# Patient Record
Sex: FEMALE | Race: WHITE | Employment: UNEMPLOYED | URBAN - METROPOLITAN AREA
[De-identification: names, ages, dates, MRNs, and addresses within clinical notes are randomized per-mention and may not be internally consistent; named-entity substitution may affect disease eponyms.]

---

## 2019-09-24 LAB
HBV SURFACE AG SERPL QL IA: NEGATIVE
HIV 1+2 AB+HIV1 P24 AG SERPL QL IA: NEGATIVE
RUBELLA ABY IGG: NORMAL
TREPONEMA ANTIBODIES: NON REACTIVE

## 2020-04-16 LAB — GROUP B STREP PCR: NEGATIVE

## 2020-04-22 RX ORDER — LORATADINE 10 MG/1
10 TABLET ORAL DAILY
COMMUNITY

## 2020-04-22 RX ORDER — ACETAMINOPHEN 500 MG
500-1000 TABLET ORAL EVERY 6 HOURS PRN
Status: ON HOLD | COMMUNITY
End: 2020-05-02

## 2020-04-22 ASSESSMENT — MIFFLIN-ST. JEOR: SCORE: 1424.88

## 2020-04-28 ENCOUNTER — OFFICE VISIT (OUTPATIENT)
Dept: URGENT CARE | Facility: URGENT CARE | Age: 30
End: 2020-04-28
Payer: COMMERCIAL

## 2020-04-28 DIAGNOSIS — Z20.822 SUSPECTED 2019 NOVEL CORONAVIRUS INFECTION: Primary | ICD-10-CM

## 2020-04-28 PROCEDURE — 87635 SARS-COV-2 COVID-19 AMP PRB: CPT | Performed by: FAMILY MEDICINE

## 2020-04-28 PROCEDURE — 99000 SPECIMEN HANDLING OFFICE-LAB: CPT | Performed by: FAMILY MEDICINE

## 2020-04-28 PROCEDURE — 99207 ZZC NO BILLABLE SERVICE THIS VISIT: CPT

## 2020-04-29 ENCOUNTER — ANESTHESIA EVENT (OUTPATIENT)
Dept: OBGYN | Facility: CLINIC | Age: 30
End: 2020-04-29
Payer: COMMERCIAL

## 2020-04-29 ENCOUNTER — HOSPITAL ENCOUNTER (OUTPATIENT)
Dept: LAB | Facility: CLINIC | Age: 30
Discharge: HOME | End: 2020-04-29
Attending: OBSTETRICS & GYNECOLOGY | Admitting: OBSTETRICS & GYNECOLOGY
Payer: COMMERCIAL

## 2020-04-29 DIAGNOSIS — Z01.812 PRE-OPERATIVE LABORATORY EXAMINATION: ICD-10-CM

## 2020-04-29 LAB
ABO + RH BLD: ABNORMAL
ABO + RH BLD: ABNORMAL
BLD GP AB INVEST PLASRBC-IMP: ABNORMAL
BLD GP AB SCN SERPL QL: ABNORMAL
BLOOD BANK CMNT PATIENT-IMP: ABNORMAL
HGB BLD-MCNC: 12.6 G/DL (ref 11.7–15.7)
SARS-COV-2 RNA SPEC QL NAA+PROBE: NOT DETECTED
SPECIMEN EXP DATE BLD: ABNORMAL
SPECIMEN SOURCE: NORMAL

## 2020-04-29 PROCEDURE — 86870 RBC ANTIBODY IDENTIFICATION: CPT | Performed by: OBSTETRICS & GYNECOLOGY

## 2020-04-29 PROCEDURE — 86850 RBC ANTIBODY SCREEN: CPT | Performed by: OBSTETRICS & GYNECOLOGY

## 2020-04-29 PROCEDURE — 86900 BLOOD TYPING SEROLOGIC ABO: CPT | Performed by: OBSTETRICS & GYNECOLOGY

## 2020-04-29 PROCEDURE — 86901 BLOOD TYPING SEROLOGIC RH(D): CPT | Performed by: OBSTETRICS & GYNECOLOGY

## 2020-04-29 PROCEDURE — 36415 COLL VENOUS BLD VENIPUNCTURE: CPT | Performed by: OBSTETRICS & GYNECOLOGY

## 2020-04-29 PROCEDURE — 85018 HEMOGLOBIN: CPT | Performed by: OBSTETRICS & GYNECOLOGY

## 2020-04-29 PROCEDURE — 86780 TREPONEMA PALLIDUM: CPT | Performed by: OBSTETRICS & GYNECOLOGY

## 2020-04-30 ENCOUNTER — ANESTHESIA (OUTPATIENT)
Dept: OBGYN | Facility: CLINIC | Age: 30
End: 2020-04-30
Payer: COMMERCIAL

## 2020-04-30 ENCOUNTER — HOSPITAL ENCOUNTER (INPATIENT)
Facility: CLINIC | Age: 30
LOS: 2 days | Discharge: HOME HEALTH | End: 2020-05-02
Attending: OBSTETRICS & GYNECOLOGY | Admitting: OBSTETRICS & GYNECOLOGY
Payer: COMMERCIAL

## 2020-04-30 DIAGNOSIS — D62 ANEMIA DUE TO BLOOD LOSS, ACUTE: ICD-10-CM

## 2020-04-30 DIAGNOSIS — Z98.891 S/P CESAREAN SECTION: Primary | ICD-10-CM

## 2020-04-30 LAB
BLOOD BANK CMNT PATIENT-IMP: NORMAL
BLOOD BANK CMNT PATIENT-IMP: NORMAL
T PALLIDUM AB SER QL: NONREACTIVE

## 2020-04-30 PROCEDURE — 12000000 ZZH R&B MED SURG/OB

## 2020-04-30 PROCEDURE — 80307 DRUG TEST PRSMV CHEM ANLYZR: CPT | Performed by: OBSTETRICS & GYNECOLOGY

## 2020-04-30 PROCEDURE — 37000009 ZZH ANESTHESIA TECHNICAL FEE, EACH ADDTL 15 MIN: Performed by: OBSTETRICS & GYNECOLOGY

## 2020-04-30 PROCEDURE — 25000128 H RX IP 250 OP 636: Performed by: NURSE ANESTHETIST, CERTIFIED REGISTERED

## 2020-04-30 PROCEDURE — 25000132 ZZH RX MED GY IP 250 OP 250 PS 637: Performed by: OBSTETRICS & GYNECOLOGY

## 2020-04-30 PROCEDURE — 71000013 ZZH RECOVERY PHASE 1 LEVEL 1 EA ADDTL HR: Performed by: OBSTETRICS & GYNECOLOGY

## 2020-04-30 PROCEDURE — 25000125 ZZHC RX 250: Performed by: OBSTETRICS & GYNECOLOGY

## 2020-04-30 PROCEDURE — 40000358 ZZHCL STATISTIC DRUG SCREEN MULTIPLE (METRO): Performed by: OBSTETRICS & GYNECOLOGY

## 2020-04-30 PROCEDURE — 25800030 ZZH RX IP 258 OP 636: Performed by: OBSTETRICS & GYNECOLOGY

## 2020-04-30 PROCEDURE — 27210794 ZZH OR GENERAL SUPPLY STERILE: Performed by: OBSTETRICS & GYNECOLOGY

## 2020-04-30 PROCEDURE — 25000125 ZZHC RX 250

## 2020-04-30 PROCEDURE — 37000008 ZZH ANESTHESIA TECHNICAL FEE, 1ST 30 MIN: Performed by: OBSTETRICS & GYNECOLOGY

## 2020-04-30 PROCEDURE — 36000058 ZZH SURGERY LEVEL 3 EA 15 ADDTL MIN: Performed by: OBSTETRICS & GYNECOLOGY

## 2020-04-30 PROCEDURE — 71000012 ZZH RECOVERY PHASE 1 LEVEL 1 FIRST HR: Performed by: OBSTETRICS & GYNECOLOGY

## 2020-04-30 PROCEDURE — 25000125 ZZHC RX 250: Performed by: NURSE ANESTHETIST, CERTIFIED REGISTERED

## 2020-04-30 PROCEDURE — 25000128 H RX IP 250 OP 636: Performed by: OBSTETRICS & GYNECOLOGY

## 2020-04-30 PROCEDURE — 25000128 H RX IP 250 OP 636: Performed by: ANESTHESIOLOGY

## 2020-04-30 PROCEDURE — 36000056 ZZH SURGERY LEVEL 3 1ST 30 MIN: Performed by: OBSTETRICS & GYNECOLOGY

## 2020-04-30 RX ORDER — OXYTOCIN 10 [USP'U]/ML
10 INJECTION, SOLUTION INTRAMUSCULAR; INTRAVENOUS
Status: DISCONTINUED | OUTPATIENT
Start: 2020-04-30 | End: 2020-05-02 | Stop reason: HOSPADM

## 2020-04-30 RX ORDER — AMOXICILLIN 250 MG
2 CAPSULE ORAL 2 TIMES DAILY
Status: DISCONTINUED | OUTPATIENT
Start: 2020-04-30 | End: 2020-05-02 | Stop reason: HOSPADM

## 2020-04-30 RX ORDER — ACETAMINOPHEN 325 MG/1
975 TABLET ORAL EVERY 6 HOURS
Status: DISCONTINUED | OUTPATIENT
Start: 2020-04-30 | End: 2020-05-02 | Stop reason: HOSPADM

## 2020-04-30 RX ORDER — MISOPROSTOL 200 UG/1
400 TABLET ORAL
Status: DISCONTINUED | OUTPATIENT
Start: 2020-04-30 | End: 2020-05-02 | Stop reason: HOSPADM

## 2020-04-30 RX ORDER — OXYTOCIN/0.9 % SODIUM CHLORIDE 30/500 ML
PLASTIC BAG, INJECTION (ML) INTRAVENOUS PRN
Status: DISCONTINUED | OUTPATIENT
Start: 2020-04-30 | End: 2020-04-30

## 2020-04-30 RX ORDER — SODIUM CHLORIDE, SODIUM LACTATE, POTASSIUM CHLORIDE, CALCIUM CHLORIDE 600; 310; 30; 20 MG/100ML; MG/100ML; MG/100ML; MG/100ML
INJECTION, SOLUTION INTRAVENOUS CONTINUOUS
Status: DISCONTINUED | OUTPATIENT
Start: 2020-04-30 | End: 2020-04-30

## 2020-04-30 RX ORDER — ONDANSETRON 4 MG/1
4 TABLET, ORALLY DISINTEGRATING ORAL EVERY 30 MIN PRN
Status: DISCONTINUED | OUTPATIENT
Start: 2020-04-30 | End: 2020-04-30 | Stop reason: HOSPADM

## 2020-04-30 RX ORDER — METHYLERGONOVINE MALEATE 0.2 MG/ML
200 INJECTION INTRAVENOUS
Status: DISCONTINUED | OUTPATIENT
Start: 2020-04-30 | End: 2020-05-02 | Stop reason: HOSPADM

## 2020-04-30 RX ORDER — SODIUM CHLORIDE, SODIUM LACTATE, POTASSIUM CHLORIDE, CALCIUM CHLORIDE 600; 310; 30; 20 MG/100ML; MG/100ML; MG/100ML; MG/100ML
INJECTION, SOLUTION INTRAVENOUS CONTINUOUS
Status: DISCONTINUED | OUTPATIENT
Start: 2020-04-30 | End: 2020-04-30 | Stop reason: HOSPADM

## 2020-04-30 RX ORDER — MODIFIED LANOLIN
OINTMENT (GRAM) TOPICAL
Status: DISCONTINUED | OUTPATIENT
Start: 2020-04-30 | End: 2020-05-02 | Stop reason: HOSPADM

## 2020-04-30 RX ORDER — MAGNESIUM HYDROXIDE 1200 MG/15ML
LIQUID ORAL PRN
Status: DISCONTINUED | OUTPATIENT
Start: 2020-04-30 | End: 2020-05-02 | Stop reason: HOSPADM

## 2020-04-30 RX ORDER — ONDANSETRON 2 MG/ML
4 INJECTION INTRAMUSCULAR; INTRAVENOUS EVERY 6 HOURS PRN
Status: DISCONTINUED | OUTPATIENT
Start: 2020-04-30 | End: 2020-05-02 | Stop reason: HOSPADM

## 2020-04-30 RX ORDER — NALOXONE HYDROCHLORIDE 0.4 MG/ML
.1-.4 INJECTION, SOLUTION INTRAMUSCULAR; INTRAVENOUS; SUBCUTANEOUS
Status: CANCELLED | OUTPATIENT
Start: 2020-04-30 | End: 2020-05-01

## 2020-04-30 RX ORDER — ACETAMINOPHEN 325 MG/1
975 TABLET ORAL ONCE
Status: COMPLETED | OUTPATIENT
Start: 2020-04-30 | End: 2020-04-30

## 2020-04-30 RX ORDER — OXYTOCIN/0.9 % SODIUM CHLORIDE 30/500 ML
340 PLASTIC BAG, INJECTION (ML) INTRAVENOUS CONTINUOUS PRN
Status: DISCONTINUED | OUTPATIENT
Start: 2020-04-30 | End: 2020-05-02 | Stop reason: HOSPADM

## 2020-04-30 RX ORDER — ONDANSETRON 2 MG/ML
4 INJECTION INTRAMUSCULAR; INTRAVENOUS EVERY 30 MIN PRN
Status: DISCONTINUED | OUTPATIENT
Start: 2020-04-30 | End: 2020-04-30 | Stop reason: HOSPADM

## 2020-04-30 RX ORDER — IBUPROFEN 800 MG/1
800 TABLET, FILM COATED ORAL EVERY 6 HOURS
Status: DISCONTINUED | OUTPATIENT
Start: 2020-05-01 | End: 2020-05-02 | Stop reason: HOSPADM

## 2020-04-30 RX ORDER — KETOROLAC TROMETHAMINE 30 MG/ML
30 INJECTION, SOLUTION INTRAMUSCULAR; INTRAVENOUS EVERY 6 HOURS
Status: COMPLETED | OUTPATIENT
Start: 2020-04-30 | End: 2020-05-01

## 2020-04-30 RX ORDER — NALOXONE HYDROCHLORIDE 0.4 MG/ML
.1-.4 INJECTION, SOLUTION INTRAMUSCULAR; INTRAVENOUS; SUBCUTANEOUS
Status: DISCONTINUED | OUTPATIENT
Start: 2020-04-30 | End: 2020-05-02 | Stop reason: HOSPADM

## 2020-04-30 RX ORDER — NALBUPHINE HYDROCHLORIDE 20 MG/ML
5 INJECTION, SOLUTION INTRAMUSCULAR; INTRAVENOUS; SUBCUTANEOUS
Status: DISCONTINUED | OUTPATIENT
Start: 2020-04-30 | End: 2020-05-02 | Stop reason: HOSPADM

## 2020-04-30 RX ORDER — OXYTOCIN/0.9 % SODIUM CHLORIDE 30/500 ML
100 PLASTIC BAG, INJECTION (ML) INTRAVENOUS CONTINUOUS
Status: DISCONTINUED | OUTPATIENT
Start: 2020-04-30 | End: 2020-05-02 | Stop reason: HOSPADM

## 2020-04-30 RX ORDER — MORPHINE SULFATE 1 MG/ML
INJECTION, SOLUTION EPIDURAL; INTRATHECAL; INTRAVENOUS PRN
Status: DISCONTINUED | OUTPATIENT
Start: 2020-04-30 | End: 2020-04-30

## 2020-04-30 RX ORDER — HYDRALAZINE HYDROCHLORIDE 20 MG/ML
2.5-5 INJECTION INTRAMUSCULAR; INTRAVENOUS EVERY 10 MIN PRN
Status: DISCONTINUED | OUTPATIENT
Start: 2020-04-30 | End: 2020-04-30 | Stop reason: HOSPADM

## 2020-04-30 RX ORDER — OXYCODONE HYDROCHLORIDE 5 MG/1
5 TABLET ORAL EVERY 4 HOURS PRN
Status: DISCONTINUED | OUTPATIENT
Start: 2020-04-30 | End: 2020-05-02 | Stop reason: HOSPADM

## 2020-04-30 RX ORDER — TRANEXAMIC ACID 10 MG/ML
1 INJECTION, SOLUTION INTRAVENOUS EVERY 30 MIN PRN
Status: DISCONTINUED | OUTPATIENT
Start: 2020-04-30 | End: 2020-05-02 | Stop reason: HOSPADM

## 2020-04-30 RX ORDER — DIMENHYDRINATE 50 MG/ML
25 INJECTION, SOLUTION INTRAMUSCULAR; INTRAVENOUS
Status: DISCONTINUED | OUTPATIENT
Start: 2020-04-30 | End: 2020-04-30 | Stop reason: HOSPADM

## 2020-04-30 RX ORDER — DEXTROSE, SODIUM CHLORIDE, SODIUM LACTATE, POTASSIUM CHLORIDE, AND CALCIUM CHLORIDE 5; .6; .31; .03; .02 G/100ML; G/100ML; G/100ML; G/100ML; G/100ML
INJECTION, SOLUTION INTRAVENOUS CONTINUOUS
Status: DISCONTINUED | OUTPATIENT
Start: 2020-04-30 | End: 2020-05-02 | Stop reason: HOSPADM

## 2020-04-30 RX ORDER — HYDROMORPHONE HYDROCHLORIDE 1 MG/ML
.3-.5 INJECTION, SOLUTION INTRAMUSCULAR; INTRAVENOUS; SUBCUTANEOUS EVERY 5 MIN PRN
Status: DISCONTINUED | OUTPATIENT
Start: 2020-04-30 | End: 2020-04-30 | Stop reason: HOSPADM

## 2020-04-30 RX ORDER — BUPIVACAINE HYDROCHLORIDE 7.5 MG/ML
INJECTION, SOLUTION INTRASPINAL PRN
Status: DISCONTINUED | OUTPATIENT
Start: 2020-04-30 | End: 2020-04-30

## 2020-04-30 RX ORDER — BISACODYL 10 MG
10 SUPPOSITORY, RECTAL RECTAL DAILY PRN
Status: DISCONTINUED | OUTPATIENT
Start: 2020-05-02 | End: 2020-05-02 | Stop reason: HOSPADM

## 2020-04-30 RX ORDER — ONDANSETRON 2 MG/ML
INJECTION INTRAMUSCULAR; INTRAVENOUS PRN
Status: DISCONTINUED | OUTPATIENT
Start: 2020-04-30 | End: 2020-04-30

## 2020-04-30 RX ORDER — FENTANYL CITRATE 50 UG/ML
25-50 INJECTION, SOLUTION INTRAMUSCULAR; INTRAVENOUS
Status: DISCONTINUED | OUTPATIENT
Start: 2020-04-30 | End: 2020-04-30 | Stop reason: HOSPADM

## 2020-04-30 RX ORDER — OXYTOCIN/0.9 % SODIUM CHLORIDE 30/500 ML
PLASTIC BAG, INJECTION (ML) INTRAVENOUS
Status: COMPLETED
Start: 2020-04-30 | End: 2020-04-30

## 2020-04-30 RX ORDER — CEFAZOLIN SODIUM 2 G/100ML
2 INJECTION, SOLUTION INTRAVENOUS
Status: COMPLETED | OUTPATIENT
Start: 2020-04-30 | End: 2020-04-30

## 2020-04-30 RX ORDER — SIMETHICONE 80 MG
80 TABLET,CHEWABLE ORAL 4 TIMES DAILY PRN
Status: DISCONTINUED | OUTPATIENT
Start: 2020-04-30 | End: 2020-05-02 | Stop reason: HOSPADM

## 2020-04-30 RX ORDER — HYDROCORTISONE 2.5 %
CREAM (GRAM) TOPICAL 3 TIMES DAILY PRN
Status: DISCONTINUED | OUTPATIENT
Start: 2020-04-30 | End: 2020-05-02 | Stop reason: HOSPADM

## 2020-04-30 RX ORDER — CEFAZOLIN SODIUM 1 G/3ML
1 INJECTION, POWDER, FOR SOLUTION INTRAMUSCULAR; INTRAVENOUS SEE ADMIN INSTRUCTIONS
Status: DISCONTINUED | OUTPATIENT
Start: 2020-04-30 | End: 2020-04-30

## 2020-04-30 RX ORDER — CITRIC ACID/SODIUM CITRATE 334-500MG
30 SOLUTION, ORAL ORAL
Status: COMPLETED | OUTPATIENT
Start: 2020-04-30 | End: 2020-04-30

## 2020-04-30 RX ORDER — GLYCOPYRROLATE 0.2 MG/ML
INJECTION, SOLUTION INTRAMUSCULAR; INTRAVENOUS PRN
Status: DISCONTINUED | OUTPATIENT
Start: 2020-04-30 | End: 2020-04-30

## 2020-04-30 RX ORDER — DEXAMETHASONE SODIUM PHOSPHATE 4 MG/ML
INJECTION, SOLUTION INTRA-ARTICULAR; INTRALESIONAL; INTRAMUSCULAR; INTRAVENOUS; SOFT TISSUE PRN
Status: DISCONTINUED | OUTPATIENT
Start: 2020-04-30 | End: 2020-04-30

## 2020-04-30 RX ORDER — AMOXICILLIN 250 MG
1 CAPSULE ORAL 2 TIMES DAILY
Status: DISCONTINUED | OUTPATIENT
Start: 2020-04-30 | End: 2020-05-02 | Stop reason: HOSPADM

## 2020-04-30 RX ORDER — LIDOCAINE 40 MG/G
CREAM TOPICAL
Status: DISCONTINUED | OUTPATIENT
Start: 2020-04-30 | End: 2020-05-02 | Stop reason: HOSPADM

## 2020-04-30 RX ORDER — CARBOPROST TROMETHAMINE 250 UG/ML
250 INJECTION, SOLUTION INTRAMUSCULAR
Status: DISCONTINUED | OUTPATIENT
Start: 2020-04-30 | End: 2020-05-02 | Stop reason: HOSPADM

## 2020-04-30 RX ORDER — PHENYLEPHRINE HYDROCHLORIDE 10 MG/ML
INJECTION INTRAVENOUS PRN
Status: DISCONTINUED | OUTPATIENT
Start: 2020-04-30 | End: 2020-04-30

## 2020-04-30 RX ADMIN — PHENYLEPHRINE HYDROCHLORIDE 100 MCG: 10 INJECTION INTRAVENOUS at 07:35

## 2020-04-30 RX ADMIN — ACETAMINOPHEN 975 MG: 325 TABLET, FILM COATED ORAL at 22:06

## 2020-04-30 RX ADMIN — ACETAMINOPHEN 975 MG: 325 TABLET, FILM COATED ORAL at 12:35

## 2020-04-30 RX ADMIN — OXYTOCIN-SODIUM CHLORIDE 0.9% IV SOLN 30 UNIT/500ML 30 ML: 30-0.9/5 SOLUTION at 08:00

## 2020-04-30 RX ADMIN — KETOROLAC TROMETHAMINE 30 MG: 30 INJECTION, SOLUTION INTRAMUSCULAR at 13:46

## 2020-04-30 RX ADMIN — PHENYLEPHRINE HYDROCHLORIDE 100 MCG: 10 INJECTION INTRAVENOUS at 07:56

## 2020-04-30 RX ADMIN — Medication 0.2 MG: at 07:34

## 2020-04-30 RX ADMIN — OXYTOCIN-SODIUM CHLORIDE 0.9% IV SOLN 30 UNIT/500ML 210 ML: 30-0.9/5 SOLUTION at 08:33

## 2020-04-30 RX ADMIN — PHENYLEPHRINE HYDROCHLORIDE 100 MCG: 10 INJECTION INTRAVENOUS at 08:08

## 2020-04-30 RX ADMIN — CEFAZOLIN SODIUM 2 G: 2 INJECTION, SOLUTION INTRAVENOUS at 07:35

## 2020-04-30 RX ADMIN — SODIUM CHLORIDE, POTASSIUM CHLORIDE, SODIUM LACTATE AND CALCIUM CHLORIDE: 600; 310; 30; 20 INJECTION, SOLUTION INTRAVENOUS at 07:31

## 2020-04-30 RX ADMIN — NALBUPHINE HYDROCHLORIDE 5 MG: 20 INJECTION, SOLUTION INTRAMUSCULAR; INTRAVENOUS; SUBCUTANEOUS at 18:05

## 2020-04-30 RX ADMIN — PHENYLEPHRINE HYDROCHLORIDE 100 MCG: 10 INJECTION INTRAVENOUS at 07:42

## 2020-04-30 RX ADMIN — ONDANSETRON 4 MG: 2 INJECTION INTRAMUSCULAR; INTRAVENOUS at 12:42

## 2020-04-30 RX ADMIN — SODIUM CHLORIDE, SODIUM LACTATE, POTASSIUM CHLORIDE, CALCIUM CHLORIDE AND DEXTROSE MONOHYDRATE: 5; 600; 310; 30; 20 INJECTION, SOLUTION INTRAVENOUS at 17:19

## 2020-04-30 RX ADMIN — PHENYLEPHRINE HYDROCHLORIDE 100 MCG: 10 INJECTION INTRAVENOUS at 08:12

## 2020-04-30 RX ADMIN — KETOROLAC TROMETHAMINE 30 MG: 30 INJECTION, SOLUTION INTRAMUSCULAR at 19:55

## 2020-04-30 RX ADMIN — ACETAMINOPHEN 975 MG: 325 TABLET, FILM COATED ORAL at 06:30

## 2020-04-30 RX ADMIN — Medication 100 ML/HR: at 09:03

## 2020-04-30 RX ADMIN — DEXAMETHASONE SODIUM PHOSPHATE 4 MG: 4 INJECTION, SOLUTION INTRA-ARTICULAR; INTRALESIONAL; INTRAMUSCULAR; INTRAVENOUS; SOFT TISSUE at 07:36

## 2020-04-30 RX ADMIN — PHENYLEPHRINE HYDROCHLORIDE 100 MCG: 10 INJECTION INTRAVENOUS at 08:20

## 2020-04-30 RX ADMIN — GLYCOPYRROLATE 0.2 MG: 0.2 INJECTION, SOLUTION INTRAMUSCULAR; INTRAVENOUS at 07:36

## 2020-04-30 RX ADMIN — PHENYLEPHRINE HYDROCHLORIDE 100 MCG: 10 INJECTION INTRAVENOUS at 07:43

## 2020-04-30 RX ADMIN — ONDANSETRON 4 MG: 2 INJECTION INTRAMUSCULAR; INTRAVENOUS at 07:36

## 2020-04-30 RX ADMIN — MIDAZOLAM 1 MG: 1 INJECTION INTRAMUSCULAR; INTRAVENOUS at 07:43

## 2020-04-30 RX ADMIN — SODIUM CHLORIDE, POTASSIUM CHLORIDE, SODIUM LACTATE AND CALCIUM CHLORIDE: 600; 310; 30; 20 INJECTION, SOLUTION INTRAVENOUS at 05:52

## 2020-04-30 RX ADMIN — SODIUM CITRATE AND CITRIC ACID MONOHYDRATE 30 ML: 500; 334 SOLUTION ORAL at 06:30

## 2020-04-30 RX ADMIN — BUPIVACAINE HYDROCHLORIDE IN DEXTROSE 13.5 MG: 7.5 INJECTION, SOLUTION SUBARACHNOID at 07:34

## 2020-04-30 RX ADMIN — PHENYLEPHRINE HYDROCHLORIDE 150 MCG: 10 INJECTION INTRAVENOUS at 07:46

## 2020-04-30 RX ADMIN — SODIUM CHLORIDE, POTASSIUM CHLORIDE, SODIUM LACTATE AND CALCIUM CHLORIDE 1000 ML: 600; 310; 30; 20 INJECTION, SOLUTION INTRAVENOUS at 14:11

## 2020-04-30 RX ADMIN — PHENYLEPHRINE HYDROCHLORIDE 150 MCG: 10 INJECTION INTRAVENOUS at 08:05

## 2020-04-30 ASSESSMENT — ENCOUNTER SYMPTOMS
DYSRHYTHMIAS: 0
SEIZURES: 0
STRIDOR: 0

## 2020-04-30 ASSESSMENT — LIFESTYLE VARIABLES: TOBACCO_USE: 1

## 2020-04-30 ASSESSMENT — COPD QUESTIONNAIRES: COPD: 0

## 2020-04-30 ASSESSMENT — MIFFLIN-ST. JEOR: SCORE: 1424.88

## 2020-04-30 NOTE — ANESTHESIA POSTPROCEDURE EVALUATION
Patient: Gaudencio Cordero    Procedure(s):  REPEAT  SECTION    Diagnosis:Previous  delivery affecting pregnancy [O34.219]  Diagnosis Additional Information: No value filed.    Anesthesia Type:  Spinal    Note:  Anesthesia Post Evaluation    Patient location during evaluation: Bedside  Patient participation: Able to participate in evaluation but full recovery from regional anesthesia has not yet ocurrred but is anticipated to occur within 48 hours  Level of consciousness: awake  Pain management: adequate  Airway patency: patent  Cardiovascular status: acceptable  Respiratory status: acceptable  Hydration status: acceptable  PONV: controlled     Anesthetic complications: None          Last vitals:  Vitals:    20 0835 20 0840 20 0845   BP: 121/58 118/53 121/58   Pulse:      Resp:      Temp:      SpO2:   96%         Electronically Signed By: Arnold Chambers MD  2020  9:01 AM

## 2020-04-30 NOTE — PLAN OF CARE
Patient has remained stable since transfer. Out of bed and ambulating in room with SBA. Anthony removed at 1600. Encouraged continued fluid intake. One emesis after eating crackers and taking Tylenol. Continues to be slightly nauseated, mostly with ambulation/position changed. Zofran given. Left sided abdominal pain above incision. No abnormals noted with assessment if this area. Ice used on area to help with pain management. Declined other interventions, including narcotics at this time. Uterus firm, U/2, lochia scant.     Patients mobililty level scored using the bedside mobility assistance tool (BMAT). Patient is at a mobility level test number: 4. Mobility equipment used: none required. Required assist of 0 staff members. Further use of BMAT scoring not required.

## 2020-04-30 NOTE — OP NOTE
Section Operative Note    Date: 2020      Patient: Gaudencio Cordero   MRN: [id number]    Surgeon: Dr. Estrella Miller MD     Pre-Op Diagnosis:   -  at 39w2d  -  H/o 2 prior C/S; first was for arrest of descent, 2nd was scheduled repeat. Desires repeat.   - Tobacco use; using 0.1 PPD      Post-Op Diagnosis:   -  delivery at 39w2d   - Same, s/p below stated procedure(s)    Procedure: Repeat low transverse  section via pfannenstiel skin incision with two layer uterine closure.     Anesthesia: Spinal with Duramorph  QBL: [id number]    Complications: None apparent    Specimens: Cord blood    Indications: 29 year old admitted as a  at 39w2d for scheduled repeat  section.  The patient was counseled on risks, benefits and alternatives to  delivery. Written informed consent was obtained.     Findings:   - Moderate rectus-fascial adhesion along prior pfannenstiel incision  - Moderate bladder adhesions to lower uterine segment  - There was a large area of midline omental adhesions to anterior abdominal wall starting~4 cm superior to pfannenstiel incision extending ~2 cm superior to the umbilicus  - Live-born female infant from the vertex presentation born on 2019 at 0758, apgars 9 & 9, weight 6#2oz. Clear amniotic fluid.   Placenta intact with a 3V cord. Normal uterus, tubes and ovaries.     Procedure: The patient was taken to the operating room where spinal anesthesia was initiatedfound to be adequate.  She was placed in the dorsal supine position with a leftward tilt. A jacobs catheter was placed. She was then prepped and draped in the usual sterile fashion and a time out was performed. A pfannenstiel skin incision was made and carried down to the underlying fascia. The fascial incision was extended laterally. The fascia was grasped and elevated, and the superior and inferior aspects were dissected away from the rectus muscles with blunt and sharp dissection.  The  rectus muscles were then  at the midline and the peritoneum was entered bluntly. The peritoneal incision was extended with cautery.  A low transverse uterine incision was then made with the scalpel and the incision was extended laterally by finger fraction. The bladder blade was removed and the infant was delivered atraumatically. The cord was clamped and cut and the infant was handed off to the waiting team. The placenta was removed with gentle traction on the cord. The uterus was then exteriorized and cleared of all clots and debris. The hysterotomy was then closed with 0-Vicryl in a running, locked fashion. A second imbricating layer was then placed using 0-Monocryl. The hysterotomy was noted to be hemostatic.  The posterior cul-de-sac was then cleared of all clots and debris. The uterus was replaced within the abdomen. The hysterotomy remained hemostatic. The pericolic gutters were cleared. The fascial incision was then closed with 0-Vicryl in a running fashion. The subcutaneous tissues were irrigated and hemostasis achieved with the Bovie cautery prior to re-approximation of three interrupted sutures of 3-0 plain gut. The skin was then closed with 3-0 Vicryl.     All sponge, lap and needle counts were correct x 2. Ancef was given prior to skin incision. The patient tolerated the procedure well and was taken to the PACU in stable condition.     Estrella Perez MD   Pager: [phone]   April 30, 2020

## 2020-04-30 NOTE — PLAN OF CARE
[facility] for scheduled C/[facility]. VS stable. Positive fetal movement. EFM monitors explained and placed x's 2. Baseline  moderate variability with accelerations present. PIV placed, warmed LR is infusing. Procedure explained to patient. Questions answered.  Patient prepped for surgery.

## 2020-04-30 NOTE — PROVIDER NOTIFICATION
04/30/20 1742   Provider Notification   Provider Name/Title Dr. Lane   Method of Notification Electronic Page   Request Evaluate-Remote   Notification Reason Medication Request   LUISA Lane paged regarding patient continuing to have constant low grade nausea and vomiting whenever she attempts to eat anything. Order given for nubain for nausea or itching.

## 2020-04-30 NOTE — ANESTHESIA PREPROCEDURE EVALUATION
Anesthesia Pre-Procedure Evaluation    Patient: Gaudencio Cordero   MRN: [id number] : 1990          Preoperative Diagnosis: Previous  delivery affecting pregnancy [O34.219]    Procedure(s):  REPEAT  SECTION    History reviewed. No pertinent past medical history.  Past Surgical History:   Procedure Laterality Date     ABDOMEN SURGERY       x2     Anesthesia Evaluation     . Pt has had prior anesthetic. Type: Regional    No history of anesthetic complications          ROS/MED HX    ENT/Pulmonary:     (+)allergic rhinitis, tobacco use, Current use , . .   (-) asthma, COPD, EDUARD risk factors and recent URI   Neurologic:  - neg neurologic ROS    (-) seizures and CVA   Cardiovascular:  - neg cardiovascular ROS      (-) hypertension, CAD, arrhythmias and valvular problems/murmurs   METS/Exercise Tolerance:     Hematologic: Comments: Lab Test        20                       1227          HGB          12.6           COVID test- no virus - neg hematologic  ROS       Musculoskeletal:  - neg musculoskeletal ROS       GI/Hepatic:  - neg GI/hepatic ROS      (-) GERD   Renal/Genitourinary:  - ROS Renal section negative       Endo:  - neg endo ROS    (-) Type I DM, Type II DM, thyroid disease, chronic steroid usage and obesity   Psychiatric:  - neg psychiatric ROS       Infectious Disease:  - neg infectious disease ROS       Malignancy:      - no malignancy   Other:    (+) Possibly pregnant                         Physical Exam  Normal systems: cardiovascular, pulmonary and dental    Airway   Mallampati: II  TM distance: >3 FB  Neck ROM: full    Dental     Cardiovascular   Rhythm and rate: regular and normal  (-) no friction rub, no systolic click and no murmur    Pulmonary    breath sounds clear to auscultation(-) no rhonchi, no decreased breath sounds, no wheezes, no rales and no stridor            Lab Results   Component Value Date    HGB 12.6 2020       Preop Vitals  BP Readings from Last 3  "Encounters:   04/30/20 122/59    Pulse Readings from Last 3 Encounters:   04/30/20 90      Resp Readings from Last 3 Encounters:   04/30/20 16    SpO2 Readings from Last 3 Encounters:   No data found for SpO2      Temp Readings from Last 1 Encounters:   04/30/20 98.3  F (36.8  C) (Oral)    Ht Readings from Last 1 Encounters:   04/30/20 1.651 m (5' 5\")      Wt Readings from Last 1 Encounters:   04/30/20 69.4 kg (153 lb)    Estimated body mass index is 25.46 kg/m  as calculated from the following:    Height as of this encounter: 1.651 m (5' 5\").    Weight as of this encounter: 69.4 kg (153 lb).       Anesthesia Plan      History & Physical Review  History and physical reviewed and following examination; no interval change.    ASA Status:  2 .    NPO Status:  > 8 hours    Plan for Spinal   PONV prophylaxis:  Ondansetron (or other 5HT-3) and Dexamethasone or Solumedrol         Postoperative Care  Postoperative pain management:  IV analgesics and Neuraxial analgesia.      Consents  Anesthetic plan, risks, benefits and alternatives discussed with:  Patient and Spouse..                 Arnold Chambers MD                    .  "

## 2020-04-30 NOTE — ANESTHESIA PROCEDURE NOTES
Procedure note : intrathecal  Staff -   Anesthesiologist:  Arnold Chambers MD      Performed By: anesthesiologist    Referred By: karin    Pre-Procedure  Performed by Arnold Chambers MD  Referred by karin  Location: OB      Pre-Anesthestic Checklist: patient identified, IV checked, site marked, risks and benefits discussed, informed consent, monitors and equipment checked, pre-op evaluation and at physician/surgeon's request    Timeout  Correct Patient: Yes   Correct Procedure: Yes   Correct Site: Yes   Correct Laterality: Yes and N/A   Correct Position: Yes   Site Marked: Yes   .   Procedure Documentation  ASA 2  .    Procedure: intrathecal, .   Patient Position:sitting Insertion Site:L3-4  (midline approach)     Patient Prep/Sterile Barriers; mask, sterile gloves, patient draped.  .  Needle:  Spinal Needle (gauge): 25  Spinal/LP Needle Length (inches): 3.5 # of attempts: 1 and # of redirects:  .        Assessment/Narrative  Paresthesias: No.  .  .  clear CSF fluid removed . Time Injected: 07:34

## 2020-04-30 NOTE — PLAN OF CARE
Returned to MAC #1 at 0833 following a scheduled . Incision is covered with an Island dressing which is dry and intact. Fundus is firm at u/2 with small rubra. Denies any pain or nausea. C/o facial itching but, does not want any medication at this time. Baby placed skin to skin and is breast feeding well.

## 2020-04-30 NOTE — PLAN OF CARE
Data: Gaudencio Cordero transferred to 443 via cart at 1035. Baby transferred via parent's arms.  Action: Receiving unit notified of transfer: Yes. Patient and family notified of room change. Report given to Patty MEADOWS at 1035. Belongings sent to receiving unit. Accompanied by Registered Nurse. Oriented patient to surroundings. Call light within reach. ID bands double-checked with receiving RN.  Response: Patient tolerated transfer and is stable.

## 2020-04-30 NOTE — ANESTHESIA CARE TRANSFER NOTE
Patient: Gaudencio Cordero    Procedure(s):  REPEAT  SECTION    Diagnosis: Previous  delivery affecting pregnancy [O34.219]  Diagnosis Additional Information: No value filed.    Anesthesia Type:   Spinal     Note:  Airway :Room Air  Patient transferred to:PACU  Comments: Pt transferred to PACU; VSS; Report to RNHandoff Report: Identifed the Patient, Identified the Reponsible Provider, Reviewed the pertinent medical history, Discussed the surgical course, Reviewed Intra-OP anesthesia mangement and issues during anesthesia, Set expectations for post-procedure period and Allowed opportunity for questions and acknowledgement of understanding      Vitals: (Last set prior to Anesthesia Care Transfer)    CRNA VITALS  2020 0803 - 2020 0836      2020             Pulse:  71    SpO2:  98 %                Electronically Signed By: RIGOBERTO Kaufman CRNA  2020  8:36 AM

## 2020-05-01 LAB
ACETAMINOPHEN QUAL: POSITIVE
AMANTADINE: NEGATIVE
AMITRIPTYLINE QUAL: NEGATIVE
AMOXAPINE: NEGATIVE
AMPHETAMINES QUAL: NEGATIVE
ATROPINE: NEGATIVE
BENZODIAZ UR QL: NEGATIVE
BUPROPION QUAL: NEGATIVE
CAFFEINE QUAL: POSITIVE
CANNABINOIDS UR QL SCN: NEGATIVE
CARBAMAZEPINE QUAL: NEGATIVE
CHLORPHENIRAMINE: NEGATIVE
CHLORPROMAZINE: NEGATIVE
CITALOPRAM QUAL: NEGATIVE
CLOMIPRAMINE QUAL: NEGATIVE
COCAINE QUAL: NEGATIVE
COCAINE UR QL: NEGATIVE
CODEINE QUAL: NEGATIVE
DESIPRAMINE QUAL: NEGATIVE
DEXTROMETHORPHAN: NEGATIVE
DIPHENHYDRAMINE: NEGATIVE
DOXEPIN/METABOLITE: NEGATIVE
DOXYLAMINE: NEGATIVE
EPHEDRINE OR PSEUDO: NEGATIVE
FENTANYL QUAL: NEGATIVE
FLUOXETINE AND METAB: NEGATIVE
HGB BLD-MCNC: 10.4 G/DL (ref 11.7–15.7)
HYDROCODONE QUAL: NEGATIVE
HYDROMORPHONE QUAL: NEGATIVE
IBUPROFEN QUAL: NEGATIVE
IMIPRAMINE QUAL: NEGATIVE
KETAMINE QUAL: NEGATIVE
LAMOTRIGINE QUAL: NEGATIVE
LIDOCAIN SPEC QL: NEGATIVE
LOXAPINE: NEGATIVE
MAPROTYLINE: NEGATIVE
MDMA QUAL: NEGATIVE
MEPERIDINE QUAL: NEGATIVE
METHAMPHETAMINE: NEGATIVE
METHODONE QUAL: NEGATIVE
MIRTAZAPINE QUAL: NEGATIVE
MORPHINE QUAL: NEGATIVE
NICOTINE: NEGATIVE
NORTRIPTYLINE QUAL: NEGATIVE
OLANZAPINE QUAL: NEGATIVE
OPIATES UR QL SCN: NEGATIVE
OXYCODONE QUAL: NEGATIVE
PENTAZOCINE: NEGATIVE
PHENCYCLIDINE QUAL: NEGATIVE
PHENTERMINE: NEGATIVE
PROPOFOL QUAL: NEGATIVE
PROPOXPHENE QUAL: NEGATIVE
PROPRANOLOL QUAL: NEGATIVE
PYRILAMINE: NEGATIVE
QUETIAPINE METAB QUAL: NEGATIVE
SALICYLATE QUAL: NEGATIVE
SERTRALINE QUAL: NEGATIVE
THEOBROMINE: POSITIVE
TOPIRAMATE QUAL: NEGATIVE
TRAMADOL QUAL: NEGATIVE
TRIMIPRAMINE QUAL: NEGATIVE
VENLAFAXINE QUAL: NEGATIVE

## 2020-05-01 PROCEDURE — 85461 HEMOGLOBIN FETAL: CPT | Performed by: OBSTETRICS & GYNECOLOGY

## 2020-05-01 PROCEDURE — 25000128 H RX IP 250 OP 636: Performed by: ANESTHESIOLOGY

## 2020-05-01 PROCEDURE — 25000132 ZZH RX MED GY IP 250 OP 250 PS 637: Performed by: OBSTETRICS & GYNECOLOGY

## 2020-05-01 PROCEDURE — 85018 HEMOGLOBIN: CPT | Performed by: OBSTETRICS & GYNECOLOGY

## 2020-05-01 PROCEDURE — 86900 BLOOD TYPING SEROLOGIC ABO: CPT | Performed by: OBSTETRICS & GYNECOLOGY

## 2020-05-01 PROCEDURE — 25000128 H RX IP 250 OP 636: Performed by: OBSTETRICS & GYNECOLOGY

## 2020-05-01 PROCEDURE — 36415 COLL VENOUS BLD VENIPUNCTURE: CPT | Performed by: OBSTETRICS & GYNECOLOGY

## 2020-05-01 PROCEDURE — 12000000 ZZH R&B MED SURG/OB

## 2020-05-01 PROCEDURE — 86901 BLOOD TYPING SEROLOGIC RH(D): CPT | Performed by: OBSTETRICS & GYNECOLOGY

## 2020-05-01 RX ADMIN — SENNOSIDES AND DOCUSATE SODIUM 1 TABLET: 8.6; 5 TABLET ORAL at 20:37

## 2020-05-01 RX ADMIN — IBUPROFEN 800 MG: 800 TABLET ORAL at 08:15

## 2020-05-01 RX ADMIN — ACETAMINOPHEN 975 MG: 325 TABLET, FILM COATED ORAL at 10:27

## 2020-05-01 RX ADMIN — NALBUPHINE HYDROCHLORIDE 5 MG: 20 INJECTION, SOLUTION INTRAMUSCULAR; INTRAVENOUS; SUBCUTANEOUS at 00:10

## 2020-05-01 RX ADMIN — ACETAMINOPHEN 975 MG: 325 TABLET, FILM COATED ORAL at 16:42

## 2020-05-01 RX ADMIN — IBUPROFEN 800 MG: 800 TABLET ORAL at 20:37

## 2020-05-01 RX ADMIN — SENNOSIDES AND DOCUSATE SODIUM 1 TABLET: 8.6; 5 TABLET ORAL at 08:15

## 2020-05-01 RX ADMIN — ACETAMINOPHEN 975 MG: 325 TABLET, FILM COATED ORAL at 03:54

## 2020-05-01 RX ADMIN — IBUPROFEN 800 MG: 800 TABLET ORAL at 14:29

## 2020-05-01 RX ADMIN — ACETAMINOPHEN 975 MG: 325 TABLET, FILM COATED ORAL at 22:47

## 2020-05-01 RX ADMIN — KETOROLAC TROMETHAMINE 30 MG: 30 INJECTION, SOLUTION INTRAMUSCULAR at 01:42

## 2020-05-01 NOTE — CONSULTS
"Note copied from MOB chart per pediatrician order.  [facility]  MATERNAL CHILD HEALTH   INITIAL NICU PSYCHOSOCIAL ASSESSMENT     DATA:     Reason for Social Work Consult: MOB cigarette smoker and \"poor social situation\"    Presenting Information: BRITTNEY met with Gaudencio and Al who are partnered couple and reside in [place] with Gaudencio's 2 sons Иванus 9 and Irving who will be 6 this month. Their  Hien is Al's first child and they are prepared for her at home and Gaudencio plans to apply for WIC program through [facility].     Social Support: Both extended families are nearby and supportive.    Employment: Gaudencio works as a / and hopes to return to this when able. Al is self employed. Gaudencio reports that her mother will watch the baby.     Insurance: [place]    Pediatrician: Undecided, Gaudencio moved from [place] a year ago and has not established pediatric care for her children yet. She is seeing [facility]  and may decide to go with that clinic.    Source of Financial Support: Employment     Mental Health History: Gaudencio has not completed her EPDS at this time. She denies any history of anxiety/depression and reports she is feeling good.    History of Postpartum Mood Disorders: Gaudencio denies any history of PPMD with her other children.    Chemical Health History: Gaudencio smokes cigarettes and her toxicology is negative. Baby's toxicology is pending. Per State Mandate if baby's toxicology results are positive BRITTNEY will file report with [facility] CPS.      INTERVENTION:       BRITTNEY completed chart review and collaborated with the multidisciplinary team.     Psychosocial Assessment     Introduction to Maternal Child Health  role and scope of practice     Reviewed Hospital and Community Resources     Assessed Chemical Health History and Current Symptoms     Assessed Mental Health History and Current Symptoms     Identified stressors, barriers and family concerns     Provided " support and active empathetic listening and validation.     Provided psychoeducation on  mood and anxiety disorders, assessed for any current symptoms or history    Provided brochure Depression and Anxiety During and after Pregnancy.     ASSESSMENT:     Coping: Well    Affect: good affect and eye contact.    Mood:  appropriate, stable and calm.    Motivation/Ability to Access Services: Independent in accessing services.    Assessment of Support System: stable and involved.    Level of engagement with SW: Appropriate.     Family and parent/infant interactions: Parents seem supportive of each other and are bonding well with baby.    Assessment of parental risk for PMAD: Low.    Strengths:  caring family, willingness to accept help    Identified Barriers:  None at this time     PLAN:     SW following and available as needed.    Ravi [facility] Case Management  Inpatient   [facility]   [phone]

## 2020-05-01 NOTE — PROGRESS NOTES
[facility]  POD# 1    Pt doing well. Ambulating, voiding, tolerating PO. Decreased lochia. Pain controlled. Breastfeeding    Vitals:    20 1945 20 0000 20 0354 20 0929   BP: 112/53 100/65 106/58 105/61   Pulse: 84 69 71 73   Resp: 16 16 16 16   Temp: 98.3  F (36.8  C) 98  F (36.7  C)  98.5  F (36.9  C)   TempSrc: Oral Oral  Oral   SpO2:  100%     Weight:       Height:         Abd soft, non tender, no guarding, no rebound, adequate uterine involution  Incision - dressing dry  Ext no edema, no cyanosis    Hemoglobin   Date Value Ref Range Status   2020 10.4 (L) 11.7 - 15.7 g/dL Final   ]      A/P 29 year old  at 39w2d s/p LTCS POD# 1.     1) Postpartum s/p repeat LTCS   -Continue routine post-partum/post-op care care.  -Hemodynamically stable    - 10.1 HGB  -Diet; regular  -pain Tylenol and Motrin  -Bowel ppx- Senna/docusate/simethicone  -Rubella immune  -Rh B neg  -Breast feeding, breast pump provided  -Plan; continue routine post-partum care.    Marcia Caron MD [facility]  2020 11:14 AM

## 2020-05-01 NOTE — PLAN OF CARE
Vital signs stable on room air. Bonding well with infant. Significant other at bedside and supportive. Pt is tolerating a regular diet, she states she still feels a little crummy but is able to keep the food down. Pain adequately controled with scheduled tylenol and ibuprofen. Ambulating independently. Voiding spontaneously. UTV incision, dressing is clean, dry, and intact.

## 2020-05-01 NOTE — PLAN OF CARE
Patient vital signs stable and meeting expected outcomes.  Breastfeeding well with minimal assistance from staff.   Up independently and voiding adequately.  Patient has voided multiple times since catheter removal on previous shift.  Pain well controlled with tylenol and toradol.  Incision WDL.  Patient's nausea/vomiting has improved throughout the shift and was resolved by the end of shift.  Able to tolerate PO medications, clear liquids, and started eating some crackers and pudding.  No further emesis at this time.  Nubain given for itching.  Able to perform all cares for self and infant with some assistance from staff.  Bonding well with baby.  FOB present and supportive at bedside.  Patient would like an early discharge today (5/1) if possible, and if all testing goes well with baby.  Will continue to monitor.

## 2020-05-02 VITALS
DIASTOLIC BLOOD PRESSURE: 67 MMHG | HEART RATE: 74 BPM | TEMPERATURE: 98.4 F | HEIGHT: 65 IN | SYSTOLIC BLOOD PRESSURE: 121 MMHG | WEIGHT: 153 LBS | BODY MASS INDEX: 25.49 KG/M2 | RESPIRATION RATE: 16 BRPM | OXYGEN SATURATION: 100 %

## 2020-05-02 PROBLEM — O34.219 PREVIOUS CESAREAN DELIVERY AFFECTING PREGNANCY: Status: ACTIVE | Noted: 2020-05-02

## 2020-05-02 PROBLEM — D62 ANEMIA DUE TO BLOOD LOSS, ACUTE: Status: ACTIVE | Noted: 2020-05-02

## 2020-05-02 LAB
ABO + RH BLD: NORMAL
ABO + RH BLD: NORMAL
BLOOD BANK CMNT PATIENT-IMP: NORMAL
DATE RH IMM GL GVN: NORMAL
FETAL CELL SCN BLD QL ROSETTE: NORMAL
RH IG VIALS RECOM PATIENT: NORMAL

## 2020-05-02 PROCEDURE — 25000128 H RX IP 250 OP 636: Performed by: OBSTETRICS & GYNECOLOGY

## 2020-05-02 PROCEDURE — 25000132 ZZH RX MED GY IP 250 OP 250 PS 637: Performed by: OBSTETRICS & GYNECOLOGY

## 2020-05-02 RX ORDER — ACETAMINOPHEN 325 MG/1
975 TABLET ORAL EVERY 6 HOURS
Qty: 30 TABLET | Refills: 0 | Status: SHIPPED | OUTPATIENT
Start: 2020-05-02

## 2020-05-02 RX ORDER — AMOXICILLIN 250 MG
1 CAPSULE ORAL 2 TIMES DAILY PRN
Qty: 60 TABLET | Refills: 0 | Status: SHIPPED | OUTPATIENT
Start: 2020-05-02 | End: 2020-06-01

## 2020-05-02 RX ORDER — IBUPROFEN 800 MG/1
800 TABLET, FILM COATED ORAL EVERY 6 HOURS
Qty: 30 TABLET | Refills: 0 | Status: SHIPPED | OUTPATIENT
Start: 2020-05-02

## 2020-05-02 RX ORDER — FERROUS SULFATE 325(65) MG
325 TABLET ORAL EVERY OTHER DAY
Qty: 15 TABLET | Refills: 0 | Status: SHIPPED | OUTPATIENT
Start: 2020-05-02 | End: 2020-06-01

## 2020-05-02 RX ADMIN — IBUPROFEN 800 MG: 800 TABLET ORAL at 09:29

## 2020-05-02 RX ADMIN — IBUPROFEN 800 MG: 800 TABLET ORAL at 02:47

## 2020-05-02 RX ADMIN — ACETAMINOPHEN 975 MG: 325 TABLET, FILM COATED ORAL at 04:47

## 2020-05-02 RX ADMIN — HUMAN RHO(D) IMMUNE GLOBULIN 300 MCG: 300 INJECTION, SOLUTION INTRAMUSCULAR at 12:30

## 2020-05-02 RX ADMIN — ACETAMINOPHEN 975 MG: 325 TABLET, FILM COATED ORAL at 12:39

## 2020-05-02 RX ADMIN — SENNOSIDES AND DOCUSATE SODIUM 2 TABLET: 8.6; 5 TABLET ORAL at 09:29

## 2020-05-02 NOTE — PLAN OF CARE
VSS and pt meeting expected goals for the shift. Using tylenol and ibuprofen for pain. Breastfeeding independently. Would like discharge today.

## 2020-05-02 NOTE — PLAN OF CARE
Data: Vital signs within normal limits. Postpartum checks within normal limits - see flow record. Patient eating and drinking normally. Patient able to empty bladder independently and is up ambulating. No apparent signs of infection. Incision healing well. Patient performing self cares and is able to care for infant.  Action: Patient medicated during the shift for pain. See MAR. Patient reassessed within 1 hour after each medication and pain was improved - patient stated she was comfortable. Patient education done about discharge, follow up and infant cares. See flow record.  Response: Positive attachment behaviors observed with infant. Support persons are present.     Discharge instructions were discussed and all questions and concerns addressed. A referral to home care was sent. Pt did not need a breast pump at discharge, she has one at home already. Pt discharged with infant to home in private transportation with significant other at 1248.

## 2020-05-02 NOTE — DISCHARGE INSTRUCTIONS
"Postpartum Discharge Instructions   CONGRATULATIONS!   ACTIVITY:   - You may ride in a car, but no driving for 1-2 weeks.   - Do not lift anything heavier than your baby for 6 weeks.   - You may slowly go up and down stairs as you feel able.   - Resume other exercises after 6 weeks.   - Rest when your baby is sleeping.   - Call your doctor if you are feeling \"blue\" for more than 2 weeks.   - Call your doctor immediately or go the Emergency Center if you think you might hurt yourself or your baby.     HYGIENE:   - You may take a tub bath or shower.   - Continue using a wilian bottle or sitz bath for comfort or cleanliness.   - No douching or tampon use until after 6 week checkup.     DIET:   - Wait 6 weeks before dieting to lose weight.   - Aim for gradual weight loss through healthy eating habits.   - Take a vitamin daily unless otherwise directed.     BREASTFEEDING:   - Refer to Breastfeeding Guidelines to Lactation or call [phone]    MEDICATIONS:   - Use as directed on prescription.     PAIN MANAGEMENT: as prescribed     Breast Care:   - If not breastfeeding, apply ice packs to your breasts 3 times per day for 15 minutes. Wear a tight bra for at least one week.   - If breastfeeding, nurse often to get relief, pain medication as directed.     Episiotomy:   - Continue use of wilian bottle and sitz bath as directed.   - Use Dermoplast and/or Tucks as directed.      Incision:   - Splint incision when moving and turning.   - Medications as instructed.     SPECIAL INFORMATION:   - No sexual intercourse for 6 weeks. After that, use a barrier contraception until your doctor tells you it is ok to use something different.   - Breastfeeding is not a method of birth control.   - You may have a period while breastfeeding. Your first period may come 4 to 10 weeks after delivery, or later if you are breastfeeding.   - Avoid constipation. Drink plenty of water, eat vegetables and fruits high in natural fiber,  high grain " breads and cereals. You may use a stool softener as necessary.     COMPLICATIONS:   Call you doctor if any of the following occur:   - Continuing bright red vaginal bleeding or clots larger than a lemon.   - Pain or redness in the breasts.   - Fever over 100.4 when temperature is taken by mouth.   - Burning feeling with urination.   - Bad smelling vaginal drainage.   - Incision or episiotomy pulls apart, is red or has drainage.    Postop  Birth Instructions    Lactation: [phone]  Home Care: [phone]    Activity       Do not lift more than 10 pounds for 6 weeks after surgery.  Ask family and friends for help when you need it.    No driving until you have stopped taking your pain medications (usually two weeks after surgery).    No heavy exercise or activity for 6 weeks.  Don't do anything that will put a strain on your surgery site.    Don't strain when using the toilet.  Your care team may prescribe a stool softener if you have problems with your bowel movements.     To care for your incision:       Keep the incision clean and dry.    Do not soak your incision in water. No swimming or hot tubs until it has fully healed. You may soak in the bathtub if the water level is below your incision.    Do not use peroxide, gel, cream, lotion, or ointment on your incision.    Adjust your clothes to avoid pressure on your surgery site (check the elastic in your underwear for example).     You may see a small amount of clear or pink drainage and this is normal.  Check with your health care provider:       If the drainage increases or has an odor.    If the incision reddens, you have swelling, or develop a rash.    If you have increased pain and the medicine we prescribed doesn't help.    If you have a fever above 100.4 F (38 C) with or without chills when placing thermometer under your tongue.   The area around your incision (surgery wound), will feel numb.  This is normal. The numbness should go away in less  than a year.     Keep your hands clean:  Always wash your hands before touching your incision (surgery wound). This helps reduce your risk of infection. If your hands aren't dirty, you may use an alcohol hand-rub to clean your hands. Keep your nails clean and short.    Call your healthcare provider if you have any of these symptoms:       You soak a sanitary pad with blood within 1 hour, or you see blood clots larger than a golf ball.    Bleeding that lasts more than 6 weeks.    Vaginal discharge that smells bad.    Severe pain, cramping or tenderness in your lower belly area.    A need to urinate more frequently (use the toilet more often), more urgently (use the toilet very quickly), or it burns when you urinate.    Nausea and vomiting.    Redness, swelling or pain around a vein in your leg.    Problems breastfeeding or a red or painful area on your breast.    Chest pain and cough or are gasping for air.    Problems with coping with sadness, anxiety or depression. If you have concerns about hurting yourself or the baby, call your provider immediately.      You have questions or concerns after you return home.

## 2020-05-02 NOTE — PLAN OF CARE
Pt up ambulating independently. Voiding without difficulty. Incision covered CDI. Reports adequate pain control with current pain plan. Family present and supportive. Meeting expected goals. Mother attentive to infants needs. Breastfeeding infant.

## 2020-05-02 NOTE — PROGRESS NOTES
[facility]  POD# 2    Pt doing well. Ambulating, voiding, tolerating PO. Decreased lochia. Pain controlled, in fact she has not been needing narcotics at all. Breastfeeding and coping well with infant. Requesting to go home today. Denies feeling dizzy, LH, CP or SOB.    Vitals:    20 0354 20 0929 20 1740 20 0247   BP: 106/58 105/61 119/58 114/63   Pulse: 71 73     Resp: 16 16 16 18   Temp:  98.5  F (36.9  C) 98.4  F (36.9  C) 98.2  F (36.8  C)   TempSrc:  Oral Oral Oral   SpO2:       Weight:       Height:         Abd soft, non tender, no guarding, no rebound, adequate uterine involution, dressing removed which was clean and dry, incision appeared well approximated with stitches and steri strips, no signs of seroma, hematoma or infection, healing well.  Ext no edema, no cyanosis    Lab Results   Component Value Date    HGB 10.4 2020       A/P 30 year old  at 39w2d s/p rLTCS POD# 2. Pregnancy complicated by hx of CS, tobacco use.      1) Postpartum s/p rLTCS   -Continue routine post-partum/post-op care care.  -Hemodynamically stable    - acute blood loss anemia   - iron supplementation  -Diet; regular  -pain Tylenol and Motrin  -Bowel ppx- Senna/docusate/simethicone  -Rubella immune  -Tdap given prenatally  -Rh neg -  is Rh pos - Rhogam given 2020   -Breast feeding, breast pump provided  -BC; Nexplanon  -Plan; discharge home today. Incision check in 1 week. PP visti in 6 wks. Continue PNV's and iron supplementation.     Dr. Ligia Crystal  [phone]  2020 9:14 AM

## 2020-05-02 NOTE — DISCHARGE SUMMARY
[facility]    Discharge Summary  Obstetrics    Date of Admission:  2020  Date of Discharge:  2020  Discharging Provider: Miguelina Crystal    Discharge Diagnoses   Patient Active Problem List   Diagnosis     S/P  section     Previous  delivery affecting pregnancy     Anemia due to blood loss, acute       History of Present Illness   Gaudencio Cordero is a 30 year old female now  who presented to L&D @ 39w2d GA who presented for repeat CS. Her pregnancy has been complicated by Rh neg. Please see her admit H&P for full details of her PMH, PSH, Meds, Allergies and exam on admit.    Hospital Course   The patient had a r-LTCS @ 39w2d GA, please see her delivery summary for full details.     Her postpartum course was complicated by ABLA. On postpartum day 2, she was meeting all of her postpartum goals and deemed stable for discharge. She was voiding without difficulty, tolerating a regular diet without nausea and vomiting, her pain was well controlled on oral pain medicines and her lochia was appropriate.    Hgb:   Lab Results   Component Value Date    HGB 10.4 2020    HGB 12.6 2020       Lab Results   Component Value Date    RH Neg 2020    and rhogam was given    Contraception was discussed and will be addressed at her postpartum appointment.    Instructions:  1) Call for temperature greater than 100.4F, foul smelling vaginal discharge, bleeding more than 1 pad per hour for 2 hrs, pain not controlled by oral pain meds, severe constipation or severe nausea or vomiting.  2) She was instructed to follow-up with her primary OB in 6 weeks for a routine postpartum visit  3) She was instructed to continue her PNV on discharge if she wished to breast feed her infant.   4) Continue iron supplementation every other day. While taking this medication, do not forget to take stool softeners to not get constipated. Make sure keeping yourself well hydrated.    Miguelina Crystal MD,  MD    Discharge Disposition   Discharged to home   Condition at discharge: Satisfactory    Primary Care Physician   Physician No Ref-Primary    Consultations This Hospital Stay   HOME CARE POST PARTUM/ IP CONSULT  LACTATION IP CONSULT    Discharge Orders   No discharge procedures on file.  Discharge Medications   Current Discharge Medication List      START taking these medications    Details   ferrous sulfate (FEROSUL) 325 (65 Fe) MG tablet Take 1 tablet (325 mg) by mouth every other day  Qty: 15 tablet, Refills: 0    Associated Diagnoses: Anemia due to blood loss, acute      ibuprofen (ADVIL/MOTRIN) 800 MG tablet Take 1 tablet (800 mg) by mouth every 6 hours  Qty: 30 tablet, Refills: 0    Associated Diagnoses: S/P  section      senna-docusate (SENOKOT-S/PERICOLACE) 8.6-50 MG tablet Take 1 tablet by mouth 2 times daily as needed for constipation  Qty: 60 tablet, Refills: 0    Associated Diagnoses: S/P  section         CONTINUE these medications which have CHANGED    Details   acetaminophen (TYLENOL) 325 MG tablet Take 3 tablets (975 mg) by mouth every 6 hours  Qty: 30 tablet, Refills: 0    Associated Diagnoses: S/P  section         CONTINUE these medications which have NOT CHANGED    Details   loratadine (CLARITIN) 10 MG tablet Take 10 mg by mouth daily      Prenatal Vit-Fe Fumarate-FA (PRENATAL PO) Take 1 tablet by mouth daily            Allergies   Allergies   Allergen Reactions     Augmentin Hives

## (undated) DEVICE — TRANSFER DEVICE BLOOD NDL HOLDER 364880

## (undated) DEVICE — GLOVE PROTEXIS POWDER FREE SMT 6.0  2D72PT60X

## (undated) DEVICE — PACK C-SECTION LF PL15OTA83B

## (undated) DEVICE — Device

## (undated) DEVICE — PREP CHLORAPREP 26ML TINTED ORANGE  260815

## (undated) DEVICE — SOL WATER IRRIG 1000ML BOTTLE 2F7114

## (undated) DEVICE — SU VICRYL 0 CT-1 36" J346H

## (undated) DEVICE — BLADE CLIPPER SGL USE 9680

## (undated) DEVICE — SU VICRYL 3-0 KS 27" UND J663H

## (undated) DEVICE — CATH TRAY FOLEY 16FR SILICONE 907416

## (undated) DEVICE — LINEN TOWEL PACK X10 5473

## (undated) DEVICE — LINEN FULL SHEET 5511

## (undated) DEVICE — LINEN BABY BLANKET 5434

## (undated) DEVICE — BAG CLEAR TRASH 1.3M 39X33" P4040C

## (undated) DEVICE — SU MONOCRYL 0 CT-1 36" UND Y946H

## (undated) DEVICE — STOCKING SLEEVE VASOPRESS COMPRESSION CALF MED VP501M

## (undated) DEVICE — SUCTION CANISTER MEDIVAC LINER 3000ML W/LID 65651-530

## (undated) DEVICE — GLOVE PROTEXIS BLUE W/NEU-THERA 6.5  2D73EB65

## (undated) DEVICE — CAP BABY PINK/BLUE IC-2

## (undated) DEVICE — SOL NACL 0.9% IRRIG 1000ML BOTTLE 2F7124

## (undated) DEVICE — ESU GROUND PAD ADULT W/CORD E7507

## (undated) DEVICE — LINEN HALF SHEET 5512

## (undated) DEVICE — DRSG STERI STRIP 1/2X4" R1547

## (undated) DEVICE — SU VICRYL 3-0 CT 36" J356H

## (undated) DEVICE — SU PLAIN 3-0 CT 27" 852H

## (undated) DEVICE — DRSG ABDOMINAL 07 1/2X8" 7197D

## (undated) DEVICE — GLOVE PROTEXIS W/NEU-THERA 6.5  2D73TE65

## (undated) RX ORDER — MORPHINE SULFATE 1 MG/ML
INJECTION, SOLUTION EPIDURAL; INTRATHECAL; INTRAVENOUS
Status: DISPENSED
Start: 2020-04-30

## (undated) RX ORDER — OXYTOCIN/0.9 % SODIUM CHLORIDE 30/500 ML
PLASTIC BAG, INJECTION (ML) INTRAVENOUS
Status: DISPENSED
Start: 2020-04-30

## (undated) RX ORDER — PHENYLEPHRINE HCL IN 0.9% NACL 1 MG/10 ML
SYRINGE (ML) INTRAVENOUS
Status: DISPENSED
Start: 2020-04-30

## (undated) RX ORDER — GLYCOPYRROLATE 0.2 MG/ML
INJECTION INTRAMUSCULAR; INTRAVENOUS
Status: DISPENSED
Start: 2020-04-30

## (undated) RX ORDER — EPHEDRINE SULFATE 50 MG/ML
INJECTION, SOLUTION INTRAMUSCULAR; INTRAVENOUS; SUBCUTANEOUS
Status: DISPENSED
Start: 2020-04-30

## (undated) RX ORDER — DEXAMETHASONE SODIUM PHOSPHATE 4 MG/ML
INJECTION, SOLUTION INTRA-ARTICULAR; INTRALESIONAL; INTRAMUSCULAR; INTRAVENOUS; SOFT TISSUE
Status: DISPENSED
Start: 2020-04-30

## (undated) RX ORDER — ONDANSETRON 2 MG/ML
INJECTION INTRAMUSCULAR; INTRAVENOUS
Status: DISPENSED
Start: 2020-04-30